# Patient Record
Sex: MALE | Race: WHITE | HISPANIC OR LATINO | URBAN - METROPOLITAN AREA
[De-identification: names, ages, dates, MRNs, and addresses within clinical notes are randomized per-mention and may not be internally consistent; named-entity substitution may affect disease eponyms.]

---

## 2023-05-19 ENCOUNTER — INPATIENT (INPATIENT)
Facility: HOSPITAL | Age: 28
LOS: 4 days | Discharge: HOME HEALTH | DRG: 493 | End: 2023-05-24
Attending: SURGERY | Admitting: SURGERY
Payer: COMMERCIAL

## 2023-05-19 ENCOUNTER — TRANSCRIPTION ENCOUNTER (OUTPATIENT)
Age: 28
End: 2023-05-19

## 2023-05-19 VITALS
HEART RATE: 84 BPM | DIASTOLIC BLOOD PRESSURE: 92 MMHG | OXYGEN SATURATION: 99 % | TEMPERATURE: 98 F | SYSTOLIC BLOOD PRESSURE: 148 MMHG | RESPIRATION RATE: 22 BRPM | WEIGHT: 164.91 LBS

## 2023-05-19 LAB
ALBUMIN SERPL ELPH-MCNC: 4.6 G/DL — SIGNIFICANT CHANGE UP (ref 3.3–5.2)
ALP SERPL-CCNC: 76 U/L — SIGNIFICANT CHANGE UP (ref 40–120)
ALT FLD-CCNC: 37 U/L — SIGNIFICANT CHANGE UP
ANION GAP SERPL CALC-SCNC: 16 MMOL/L — SIGNIFICANT CHANGE UP (ref 5–17)
APTT BLD: 27.1 SEC — LOW (ref 27.5–35.5)
AST SERPL-CCNC: 28 U/L — SIGNIFICANT CHANGE UP
BASE EXCESS BLDV CALC-SCNC: -0.8 MMOL/L — SIGNIFICANT CHANGE UP (ref -2–3)
BASOPHILS # BLD AUTO: 0.04 K/UL — SIGNIFICANT CHANGE UP (ref 0–0.2)
BASOPHILS NFR BLD AUTO: 0.2 % — SIGNIFICANT CHANGE UP (ref 0–2)
BILIRUB SERPL-MCNC: 0.4 MG/DL — SIGNIFICANT CHANGE UP (ref 0.4–2)
BUN SERPL-MCNC: 17 MG/DL — SIGNIFICANT CHANGE UP (ref 8–20)
CA-I SERPL-SCNC: 1.06 MMOL/L — LOW (ref 1.15–1.33)
CALCIUM SERPL-MCNC: 8.4 MG/DL — SIGNIFICANT CHANGE UP (ref 8.4–10.5)
CHLORIDE BLDV-SCNC: 99 MMOL/L — SIGNIFICANT CHANGE UP (ref 96–108)
CHLORIDE SERPL-SCNC: 97 MMOL/L — SIGNIFICANT CHANGE UP (ref 96–108)
CK MB CFR SERPL CALC: 4.9 NG/ML — SIGNIFICANT CHANGE UP (ref 0–6.7)
CK SERPL-CCNC: 395 U/L — HIGH (ref 30–200)
CO2 SERPL-SCNC: 22 MMOL/L — SIGNIFICANT CHANGE UP (ref 22–29)
CREAT SERPL-MCNC: 0.76 MG/DL — SIGNIFICANT CHANGE UP (ref 0.5–1.3)
EGFR: 126 ML/MIN/1.73M2 — SIGNIFICANT CHANGE UP
EOSINOPHIL # BLD AUTO: 0.01 K/UL — SIGNIFICANT CHANGE UP (ref 0–0.5)
EOSINOPHIL NFR BLD AUTO: 0.1 % — SIGNIFICANT CHANGE UP (ref 0–6)
ETHANOL SERPL-MCNC: <10 MG/DL — SIGNIFICANT CHANGE UP (ref 0–9)
GAS PNL BLDV: 133 MMOL/L — LOW (ref 136–145)
GAS PNL BLDV: SIGNIFICANT CHANGE UP
GLUCOSE BLDV-MCNC: 183 MG/DL — HIGH (ref 70–99)
GLUCOSE SERPL-MCNC: 180 MG/DL — HIGH (ref 70–99)
HCO3 BLDV-SCNC: 25 MMOL/L — SIGNIFICANT CHANGE UP (ref 22–29)
HCT VFR BLD CALC: 40.7 % — SIGNIFICANT CHANGE UP (ref 39–50)
HCT VFR BLDA CALC: 44 % — SIGNIFICANT CHANGE UP
HGB BLD CALC-MCNC: 14.6 G/DL — SIGNIFICANT CHANGE UP (ref 12.6–17.4)
HGB BLD-MCNC: 14.3 G/DL — SIGNIFICANT CHANGE UP (ref 13–17)
IMM GRANULOCYTES NFR BLD AUTO: 0.6 % — SIGNIFICANT CHANGE UP (ref 0–0.9)
INR BLD: 1.07 RATIO — SIGNIFICANT CHANGE UP (ref 0.88–1.16)
LACTATE BLDV-MCNC: 1.9 MMOL/L — SIGNIFICANT CHANGE UP (ref 0.5–2)
LYMPHOCYTES # BLD AUTO: 0.81 K/UL — LOW (ref 1–3.3)
LYMPHOCYTES # BLD AUTO: 4.8 % — LOW (ref 13–44)
MCHC RBC-ENTMCNC: 29.7 PG — SIGNIFICANT CHANGE UP (ref 27–34)
MCHC RBC-ENTMCNC: 35.1 GM/DL — SIGNIFICANT CHANGE UP (ref 32–36)
MCV RBC AUTO: 84.6 FL — SIGNIFICANT CHANGE UP (ref 80–100)
MONOCYTES # BLD AUTO: 0.91 K/UL — HIGH (ref 0–0.9)
MONOCYTES NFR BLD AUTO: 5.4 % — SIGNIFICANT CHANGE UP (ref 2–14)
NEUTROPHILS # BLD AUTO: 15.13 K/UL — HIGH (ref 1.8–7.4)
NEUTROPHILS NFR BLD AUTO: 88.9 % — HIGH (ref 43–77)
PCO2 BLDV: 45 MMHG — SIGNIFICANT CHANGE UP (ref 42–55)
PH BLDV: 7.35 — SIGNIFICANT CHANGE UP (ref 7.32–7.43)
PLATELET # BLD AUTO: 260 K/UL — SIGNIFICANT CHANGE UP (ref 150–400)
PO2 BLDV: 96 MMHG — HIGH (ref 25–45)
POTASSIUM BLDV-SCNC: 3.5 MMOL/L — SIGNIFICANT CHANGE UP (ref 3.5–5.1)
POTASSIUM SERPL-MCNC: 3.6 MMOL/L — SIGNIFICANT CHANGE UP (ref 3.5–5.3)
POTASSIUM SERPL-SCNC: 3.6 MMOL/L — SIGNIFICANT CHANGE UP (ref 3.5–5.3)
PROT SERPL-MCNC: 7.3 G/DL — SIGNIFICANT CHANGE UP (ref 6.6–8.7)
PROTHROM AB SERPL-ACNC: 12.4 SEC — SIGNIFICANT CHANGE UP (ref 10.5–13.4)
RBC # BLD: 4.81 M/UL — SIGNIFICANT CHANGE UP (ref 4.2–5.8)
RBC # FLD: 12.1 % — SIGNIFICANT CHANGE UP (ref 10.3–14.5)
SAO2 % BLDV: 98.6 % — SIGNIFICANT CHANGE UP
SODIUM SERPL-SCNC: 135 MMOL/L — SIGNIFICANT CHANGE UP (ref 135–145)
TROPONIN T SERPL-MCNC: <0.01 NG/ML — SIGNIFICANT CHANGE UP (ref 0–0.06)
WBC # BLD: 17 K/UL — HIGH (ref 3.8–10.5)
WBC # FLD AUTO: 17 K/UL — HIGH (ref 3.8–10.5)

## 2023-05-19 PROCEDURE — 99285 EMERGENCY DEPT VISIT HI MDM: CPT

## 2023-05-19 PROCEDURE — 74177 CT ABD & PELVIS W/CONTRAST: CPT | Mod: 26,MA

## 2023-05-19 PROCEDURE — G1004: CPT

## 2023-05-19 PROCEDURE — 70450 CT HEAD/BRAIN W/O DYE: CPT | Mod: 26,MG

## 2023-05-19 PROCEDURE — 71260 CT THORAX DX C+: CPT | Mod: 26,MA

## 2023-05-19 PROCEDURE — 73564 X-RAY EXAM KNEE 4 OR MORE: CPT | Mod: 26,RT

## 2023-05-19 PROCEDURE — 70486 CT MAXILLOFACIAL W/O DYE: CPT | Mod: 26,MA

## 2023-05-19 PROCEDURE — 93010 ELECTROCARDIOGRAM REPORT: CPT

## 2023-05-19 PROCEDURE — 73610 X-RAY EXAM OF ANKLE: CPT | Mod: 26,RT

## 2023-05-19 PROCEDURE — 71045 X-RAY EXAM CHEST 1 VIEW: CPT | Mod: 26

## 2023-05-19 PROCEDURE — 73030 X-RAY EXAM OF SHOULDER: CPT | Mod: 26,LT

## 2023-05-19 PROCEDURE — 72125 CT NECK SPINE W/O DYE: CPT | Mod: 26,MG

## 2023-05-19 PROCEDURE — 73590 X-RAY EXAM OF LOWER LEG: CPT | Mod: 26,RT

## 2023-05-19 RX ORDER — ACETAMINOPHEN 500 MG
650 TABLET ORAL ONCE
Refills: 0 | Status: COMPLETED | OUTPATIENT
Start: 2023-05-19 | End: 2023-05-19

## 2023-05-19 RX ORDER — MORPHINE SULFATE 50 MG/1
4 CAPSULE, EXTENDED RELEASE ORAL ONCE
Refills: 0 | Status: DISCONTINUED | OUTPATIENT
Start: 2023-05-19 | End: 2023-05-19

## 2023-05-19 RX ORDER — ACETAMINOPHEN 500 MG
1000 TABLET ORAL ONCE
Refills: 0 | Status: COMPLETED | OUTPATIENT
Start: 2023-05-19 | End: 2023-05-19

## 2023-05-19 RX ORDER — ONDANSETRON 8 MG/1
4 TABLET, FILM COATED ORAL ONCE
Refills: 0 | Status: COMPLETED | OUTPATIENT
Start: 2023-05-19 | End: 2023-05-19

## 2023-05-19 RX ORDER — SODIUM CHLORIDE 9 MG/ML
1000 INJECTION, SOLUTION INTRAVENOUS ONCE
Refills: 0 | Status: COMPLETED | OUTPATIENT
Start: 2023-05-19 | End: 2023-05-19

## 2023-05-19 RX ORDER — TETANUS TOXOID, REDUCED DIPHTHERIA TOXOID AND ACELLULAR PERTUSSIS VACCINE, ADSORBED 5; 2.5; 8; 8; 2.5 [IU]/.5ML; [IU]/.5ML; UG/.5ML; UG/.5ML; UG/.5ML
0.5 SUSPENSION INTRAMUSCULAR ONCE
Refills: 0 | Status: COMPLETED | OUTPATIENT
Start: 2023-05-19 | End: 2023-05-19

## 2023-05-19 RX ADMIN — MORPHINE SULFATE 4 MILLIGRAM(S): 50 CAPSULE, EXTENDED RELEASE ORAL at 19:00

## 2023-05-19 RX ADMIN — TETANUS TOXOID, REDUCED DIPHTHERIA TOXOID AND ACELLULAR PERTUSSIS VACCINE, ADSORBED 0.5 MILLILITER(S): 5; 2.5; 8; 8; 2.5 SUSPENSION INTRAMUSCULAR at 23:36

## 2023-05-19 RX ADMIN — MORPHINE SULFATE 4 MILLIGRAM(S): 50 CAPSULE, EXTENDED RELEASE ORAL at 19:45

## 2023-05-19 RX ADMIN — MORPHINE SULFATE 4 MILLIGRAM(S): 50 CAPSULE, EXTENDED RELEASE ORAL at 20:00

## 2023-05-19 RX ADMIN — MORPHINE SULFATE 4 MILLIGRAM(S): 50 CAPSULE, EXTENDED RELEASE ORAL at 21:27

## 2023-05-19 RX ADMIN — SODIUM CHLORIDE 1000 MILLILITER(S): 9 INJECTION, SOLUTION INTRAVENOUS at 23:36

## 2023-05-19 RX ADMIN — ONDANSETRON 4 MILLIGRAM(S): 8 TABLET, FILM COATED ORAL at 21:56

## 2023-05-19 RX ADMIN — Medication 650 MILLIGRAM(S): at 19:30

## 2023-05-19 RX ADMIN — Medication 650 MILLIGRAM(S): at 19:00
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
## 2023-05-19 NOTE — ED ADULT NURSE NOTE - EXTENSIONS OF SELF_ADULT
None
Implemented All Universal Safety Interventions:  [place] to call system. Call bell, personal items and telephone within reach. Instruct patient to call for assistance. Room bathroom lighting operational. Non-slip footwear when patient is off stretcher. Physically safe environment: no spills, clutter or unnecessary equipment. Stretcher in lowest position, wheels locked, appropriate side rails in place.
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
## 2023-05-19 NOTE — ED ADULT NURSE NOTE - PRIMARY CARE PROVIDER
see provider note Libtayo Pregnancy And Lactation Text: This medication is contraindicated in pregnancy and when breast feeding.

## 2023-05-19 NOTE — ED PROVIDER NOTE - CARE PLAN
Principal Discharge DX:	Tibial fracture  Secondary Diagnosis:	Traumatic injury of shoulder with fracture of scapula   1
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
## 2023-05-19 NOTE — ED ADULT NURSE NOTE - NSFALLUNIVINTERV_ED_ALL_ED
Bed/Stretcher in lowest position, wheels locked, appropriate side rails in place/Call bell, personal items and telephone in reach/Instruct patient to call for assistance before getting out of bed/chair/stretcher/Non-slip footwear applied when patient is off stretcher/[place] to call system/Physically safe environment - no spills, clutter or unnecessary equipment/Purposeful proactive rounding/Room/bathroom lighting operational, light cord in reach
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
## 2023-05-20 DIAGNOSIS — S82.209A UNSPECIFIED FRACTURE OF SHAFT OF UNSPECIFIED TIBIA, INITIAL ENCOUNTER FOR CLOSED FRACTURE: ICD-10-CM

## 2023-05-20 LAB
ABO RH CONFIRMATION: SIGNIFICANT CHANGE UP
ANION GAP SERPL CALC-SCNC: 13 MMOL/L — SIGNIFICANT CHANGE UP (ref 5–17)
BASOPHILS # BLD AUTO: 0.01 K/UL — SIGNIFICANT CHANGE UP (ref 0–0.2)
BASOPHILS NFR BLD AUTO: 0.1 % — SIGNIFICANT CHANGE UP (ref 0–2)
BLD GP AB SCN SERPL QL: SIGNIFICANT CHANGE UP
BUN SERPL-MCNC: 15.6 MG/DL — SIGNIFICANT CHANGE UP (ref 8–20)
CALCIUM SERPL-MCNC: 8.6 MG/DL — SIGNIFICANT CHANGE UP (ref 8.4–10.5)
CHLORIDE SERPL-SCNC: 99 MMOL/L — SIGNIFICANT CHANGE UP (ref 96–108)
CK MB CFR SERPL CALC: 5.1 NG/ML — SIGNIFICANT CHANGE UP (ref 0–6.7)
CK SERPL-CCNC: 475 U/L — HIGH (ref 30–200)
CO2 SERPL-SCNC: 26 MMOL/L — SIGNIFICANT CHANGE UP (ref 22–29)
CREAT SERPL-MCNC: 0.84 MG/DL — SIGNIFICANT CHANGE UP (ref 0.5–1.3)
EGFR: 122 ML/MIN/1.73M2 — SIGNIFICANT CHANGE UP
EOSINOPHIL # BLD AUTO: 0 K/UL — SIGNIFICANT CHANGE UP (ref 0–0.5)
EOSINOPHIL NFR BLD AUTO: 0 % — SIGNIFICANT CHANGE UP (ref 0–6)
GLUCOSE SERPL-MCNC: 160 MG/DL — HIGH (ref 70–99)
HCT VFR BLD CALC: 40.4 % — SIGNIFICANT CHANGE UP (ref 39–50)
HGB BLD-MCNC: 13.6 G/DL — SIGNIFICANT CHANGE UP (ref 13–17)
IMM GRANULOCYTES NFR BLD AUTO: 0.4 % — SIGNIFICANT CHANGE UP (ref 0–0.9)
LYMPHOCYTES # BLD AUTO: 0.85 K/UL — LOW (ref 1–3.3)
LYMPHOCYTES # BLD AUTO: 6.7 % — LOW (ref 13–44)
MAGNESIUM SERPL-MCNC: 1.8 MG/DL — SIGNIFICANT CHANGE UP (ref 1.6–2.6)
MCHC RBC-ENTMCNC: 29.2 PG — SIGNIFICANT CHANGE UP (ref 27–34)
MCHC RBC-ENTMCNC: 33.7 GM/DL — SIGNIFICANT CHANGE UP (ref 32–36)
MCV RBC AUTO: 86.7 FL — SIGNIFICANT CHANGE UP (ref 80–100)
MONOCYTES # BLD AUTO: 0.63 K/UL — SIGNIFICANT CHANGE UP (ref 0–0.9)
MONOCYTES NFR BLD AUTO: 5 % — SIGNIFICANT CHANGE UP (ref 2–14)
MRSA PCR RESULT.: SIGNIFICANT CHANGE UP
NEUTROPHILS # BLD AUTO: 11.08 K/UL — HIGH (ref 1.8–7.4)
NEUTROPHILS NFR BLD AUTO: 87.8 % — HIGH (ref 43–77)
PHOSPHATE SERPL-MCNC: 4.1 MG/DL — SIGNIFICANT CHANGE UP (ref 2.4–4.7)
PLATELET # BLD AUTO: 258 K/UL — SIGNIFICANT CHANGE UP (ref 150–400)
POTASSIUM SERPL-MCNC: 4.4 MMOL/L — SIGNIFICANT CHANGE UP (ref 3.5–5.3)
POTASSIUM SERPL-SCNC: 4.4 MMOL/L — SIGNIFICANT CHANGE UP (ref 3.5–5.3)
RBC # BLD: 4.66 M/UL — SIGNIFICANT CHANGE UP (ref 4.2–5.8)
RBC # FLD: 12.2 % — SIGNIFICANT CHANGE UP (ref 10.3–14.5)
S AUREUS DNA NOSE QL NAA+PROBE: SIGNIFICANT CHANGE UP
SODIUM SERPL-SCNC: 138 MMOL/L — SIGNIFICANT CHANGE UP (ref 135–145)
WBC # BLD: 12.62 K/UL — HIGH (ref 3.8–10.5)
WBC # FLD AUTO: 12.62 K/UL — HIGH (ref 3.8–10.5)

## 2023-05-20 PROCEDURE — 73080 X-RAY EXAM OF ELBOW: CPT | Mod: 26,LT

## 2023-05-20 PROCEDURE — 73090 X-RAY EXAM OF FOREARM: CPT | Mod: 26,LT

## 2023-05-20 PROCEDURE — 23570 CLTX SCAPULAR FX W/O MNPJ: CPT | Mod: LT

## 2023-05-20 PROCEDURE — 27759 TREATMENT OF TIBIA FRACTURE: CPT | Mod: RT

## 2023-05-20 PROCEDURE — 73060 X-RAY EXAM OF HUMERUS: CPT | Mod: 26,LT

## 2023-05-20 PROCEDURE — 73000 X-RAY EXAM OF COLLAR BONE: CPT | Mod: 26,LT

## 2023-05-20 PROCEDURE — 99223 1ST HOSP IP/OBS HIGH 75: CPT | Mod: 57

## 2023-05-20 PROCEDURE — 73590 X-RAY EXAM OF LOWER LEG: CPT | Mod: 26,RT

## 2023-05-20 DEVICE — NAIL TIB INTRAMED T2 ALPHA 10X330MM STRL: Type: IMPLANTABLE DEVICE | Site: RIGHT | Status: FUNCTIONAL

## 2023-05-20 DEVICE — K-WIRE STRYKER 3MM X 285MM: Type: IMPLANTABLE DEVICE | Site: RIGHT | Status: FUNCTIONAL

## 2023-05-20 DEVICE — SCREW LOKG 5X42.5MM: Type: IMPLANTABLE DEVICE | Site: RIGHT | Status: FUNCTIONAL

## 2023-05-20 DEVICE — SCREW LOKG 5X40MM: Type: IMPLANTABLE DEVICE | Site: RIGHT | Status: FUNCTIONAL

## 2023-05-20 DEVICE — SCREW LOKG 5X57MM STRL: Type: IMPLANTABLE DEVICE | Site: RIGHT | Status: FUNCTIONAL

## 2023-05-20 DEVICE — SCREW LOKG 5X45MM: Type: IMPLANTABLE DEVICE | Site: RIGHT | Status: FUNCTIONAL

## 2023-05-20 DEVICE — GUIDEWIRE BALL TIP 3MM X 1000MM FOR T2 R1.5 FEMORAL NAILING SYSTEM: Type: IMPLANTABLE DEVICE | Site: RIGHT | Status: FUNCTIONAL

## 2023-05-20 RX ORDER — TRAMADOL HYDROCHLORIDE 50 MG/1
50 TABLET ORAL EVERY 4 HOURS
Refills: 0 | Status: DISCONTINUED | OUTPATIENT
Start: 2023-05-20 | End: 2023-05-20

## 2023-05-20 RX ORDER — ENOXAPARIN SODIUM 100 MG/ML
40 INJECTION SUBCUTANEOUS ONCE
Refills: 0 | Status: DISCONTINUED | OUTPATIENT
Start: 2023-05-20 | End: 2023-05-20

## 2023-05-20 RX ORDER — SODIUM CHLORIDE 9 MG/ML
1000 INJECTION, SOLUTION INTRAVENOUS
Refills: 0 | Status: DISCONTINUED | OUTPATIENT
Start: 2023-05-20 | End: 2023-05-20

## 2023-05-20 RX ORDER — ACETAMINOPHEN 500 MG
975 TABLET ORAL EVERY 6 HOURS
Refills: 0 | Status: DISCONTINUED | OUTPATIENT
Start: 2023-05-20 | End: 2023-05-24

## 2023-05-20 RX ORDER — CHLORHEXIDINE GLUCONATE 213 G/1000ML
1 SOLUTION TOPICAL
Refills: 0 | Status: DISCONTINUED | OUTPATIENT
Start: 2023-05-20 | End: 2023-05-20

## 2023-05-20 RX ORDER — ACETAMINOPHEN 500 MG
975 TABLET ORAL EVERY 6 HOURS
Refills: 0 | Status: DISCONTINUED | OUTPATIENT
Start: 2023-05-20 | End: 2023-05-20

## 2023-05-20 RX ORDER — MUPIROCIN 20 MG/G
1 OINTMENT TOPICAL
Refills: 0 | Status: DISCONTINUED | OUTPATIENT
Start: 2023-05-20 | End: 2023-05-20

## 2023-05-20 RX ORDER — FENTANYL CITRATE 50 UG/ML
50 INJECTION INTRAVENOUS
Refills: 0 | Status: DISCONTINUED | OUTPATIENT
Start: 2023-05-20 | End: 2023-05-20

## 2023-05-20 RX ORDER — ACETAMINOPHEN 500 MG
1000 TABLET ORAL ONCE
Refills: 0 | Status: COMPLETED | OUTPATIENT
Start: 2023-05-20 | End: 2023-05-20

## 2023-05-20 RX ORDER — ENOXAPARIN SODIUM 100 MG/ML
40 INJECTION SUBCUTANEOUS EVERY 12 HOURS
Refills: 0 | Status: DISCONTINUED | OUTPATIENT
Start: 2023-05-21 | End: 2023-05-24

## 2023-05-20 RX ORDER — TRAMADOL HYDROCHLORIDE 50 MG/1
50 TABLET ORAL EVERY 4 HOURS
Refills: 0 | Status: DISCONTINUED | OUTPATIENT
Start: 2023-05-20 | End: 2023-05-24

## 2023-05-20 RX ORDER — POVIDONE-IODINE 5 %
1 AEROSOL (ML) TOPICAL ONCE
Refills: 0 | Status: DISCONTINUED | OUTPATIENT
Start: 2023-05-20 | End: 2023-05-20

## 2023-05-20 RX ORDER — OXYCODONE HYDROCHLORIDE 5 MG/1
5 TABLET ORAL EVERY 4 HOURS
Refills: 0 | Status: DISCONTINUED | OUTPATIENT
Start: 2023-05-20 | End: 2023-05-20

## 2023-05-20 RX ORDER — CEFAZOLIN SODIUM 1 G
2000 VIAL (EA) INJECTION
Refills: 0 | Status: COMPLETED | OUTPATIENT
Start: 2023-05-20 | End: 2023-05-21

## 2023-05-20 RX ORDER — OXYCODONE HYDROCHLORIDE 5 MG/1
10 TABLET ORAL EVERY 4 HOURS
Refills: 0 | Status: DISCONTINUED | OUTPATIENT
Start: 2023-05-20 | End: 2023-05-24

## 2023-05-20 RX ORDER — ONDANSETRON 8 MG/1
4 TABLET, FILM COATED ORAL ONCE
Refills: 0 | Status: COMPLETED | OUTPATIENT
Start: 2023-05-20 | End: 2023-05-20

## 2023-05-20 RX ORDER — ONDANSETRON 8 MG/1
4 TABLET, FILM COATED ORAL ONCE
Refills: 0 | Status: DISCONTINUED | OUTPATIENT
Start: 2023-05-20 | End: 2023-05-20

## 2023-05-20 RX ADMIN — SODIUM CHLORIDE 100 MILLILITER(S): 9 INJECTION, SOLUTION INTRAVENOUS at 03:30

## 2023-05-20 RX ADMIN — Medication 975 MILLIGRAM(S): at 06:42

## 2023-05-20 RX ADMIN — ONDANSETRON 4 MILLIGRAM(S): 8 TABLET, FILM COATED ORAL at 00:23

## 2023-05-20 RX ADMIN — MORPHINE SULFATE 4 MILLIGRAM(S): 50 CAPSULE, EXTENDED RELEASE ORAL at 00:00

## 2023-05-20 RX ADMIN — Medication 1000 MILLIGRAM(S): at 00:49

## 2023-05-20 RX ADMIN — Medication 975 MILLIGRAM(S): at 05:42

## 2023-05-20 RX ADMIN — MUPIROCIN 1 APPLICATION(S): 20 OINTMENT TOPICAL at 06:36

## 2023-05-20 RX ADMIN — Medication 975 MILLIGRAM(S): at 12:50

## 2023-05-20 RX ADMIN — OXYCODONE HYDROCHLORIDE 5 MILLIGRAM(S): 5 TABLET ORAL at 06:36

## 2023-05-20 RX ADMIN — Medication 1000 MILLIGRAM(S): at 21:58

## 2023-05-20 RX ADMIN — CHLORHEXIDINE GLUCONATE 1 APPLICATION(S): 213 SOLUTION TOPICAL at 06:45

## 2023-05-20 RX ADMIN — MORPHINE SULFATE 4 MILLIGRAM(S): 50 CAPSULE, EXTENDED RELEASE ORAL at 00:19

## 2023-05-20 RX ADMIN — Medication 400 MILLIGRAM(S): at 21:58

## 2023-05-20 RX ADMIN — Medication 400 MILLIGRAM(S): at 00:19

## 2023-05-20 RX ADMIN — Medication 975 MILLIGRAM(S): at 12:20

## 2023-05-20 NOTE — H&P ADULT - NSHPPHYSICALEXAM_GEN_ALL_CORE
Constitutional: Well-developed well nourished male sitting in bed in no acute distress  HEENT: Head is normocephalic, maxillofacial structures stable but tender laterally on left side, no active blood or discharge from nares or oral cavity, no connors sign / raccoon eyes, EOMI b/l, no active drainage or redness  Neck: Supple, full ROM, trachea midline  Respiratory: Unlabored, no conversational dyspnea  Cardiovascular: Regular rate & rhythm  Chest: Chest wall is non-tender to palpation, no subQ emphysema or crepitus palpated. Left shoulder swelling, tenderness over clavicle with ecchymosis. Stable Clavicle    Gastrointestinal: Abdomen soft, non-tender, non-distended, no rebound tenderness / guarding, no ecchymosis or external signs of abdominal trauma  Extremities: LUE ROM limited by pain, unable to lift arm past horizon. RLE with deformity which was splinted. No skin tenting.  Compartments soft.  Pelvis: stable  Vascular: WWP  Neurological: GCS: 15 (4/5/6). A&O x 3; no gross sensory / motor / coordination deficits  Back: no C/T/LS spine tenderness to palpation, no step-offs or signs of external trauma to the back

## 2023-05-20 NOTE — H&P ADULT - NSHPLABSRESULTS_GEN_ALL_CORE
Vital Signs Last 24 Hrs  T(C): 36.8 (20 May 2023 06:25), Max: 36.9 (19 May 2023 17:20)  T(F): 98.2 (20 May 2023 06:25), Max: 98.4 (19 May 2023 17:20)  HR: 90 (20 May 2023 06:25) (74 - 90)  BP: 126/80 (20 May 2023 06:25) (113/69 - 148/92)  BP(mean): --  RR: 16 (20 May 2023 06:25) (16 - 22)  SpO2: 96% (20 May 2023 06:25) (96% - 99%)    Parameters below as of 20 May 2023 06:25  Patient On (Oxygen Delivery Method): room air          LABS:                        13.6   12.62 )-----------( 258      ( 20 May 2023 04:22 )             40.4     05-20    138  |  99  |  15.6  ----------------------------<  160<H>  4.4   |  26.0  |  0.84    Ca    8.6      20 May 2023 04:22  Phos  4.1     05-20  Mg     1.8     05-20    TPro  7.3  /  Alb  4.6  /  TBili  0.4  /  DBili  x   /  AST  28  /  ALT  37  /  AlkPhos  76  05-19    PT/INR - ( 19 May 2023 21:50 )   PT: 12.4 sec;   INR: 1.07 ratio      PTT - ( 19 May 2023 21:50 )  PTT:27.1 sec      IMAGING:  CT H / Neck / Max Face  IMPRESSION:  CT BRAIN: Unremarkable, unenhanced CT.    CT CERVICAL SPINE:  1. No acute fracture or traumatic malalignment.  2. Subcutaneous contusion in the posterior neck.    CT FACE:  1. Acute fractures of the anterior and lateral walls of the left maxillary sinus, extends to the inferior orbital wall. No herniation of orbital contents.      MILAGROS MCMULLEN MD; Attending Radiologist  This document has been electronically signed. May 19 2023 11:28PM      CT Ch/A/P  IMPRESSION:  Comminuted displaced fracture of the left scapula with associated soft tissue swelling. Partially imaged mild left shoulder soft tissue swelling.  Otherwise, no sequela of acute traumatic injury within the chest, abdomen or pelvis.    KISHORE PENA MD; Attending Radiologist  This document has been electronically signed. May 19 2023 11:27PM        Plain Films: pending final reads  RLE- Tibial Frx  LUE- no obvious frx
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
## 2023-05-20 NOTE — H&P ADULT - NSICDXNOPASTMEDICALHX_GEN_ALL_CORE
Problem: Adult Inpatient Plan of Care  Goal: Plan of Care Review  Outcome: Ongoing, Progressing  Goal: Patient-Specific Goal (Individualized)  Outcome: Ongoing, Progressing  Goal: Absence of Hospital-Acquired Illness or Injury  Outcome: Ongoing, Progressing  Goal: Optimal Comfort and Wellbeing  Outcome: Ongoing, Progressing  Goal: Readiness for Transition of Care  Outcome: Ongoing, Progressing     Problem: Fall Injury Risk  Goal: Absence of Fall and Fall-Related Injury  Outcome: Ongoing, Progressing     Problem: Skin Injury Risk Increased  Goal: Skin Health and Integrity  Outcome: Ongoing, Progressing     
<-- Click to add NO pertinent Past Medical History
Statement Selected
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
## 2023-05-21 LAB
ANION GAP SERPL CALC-SCNC: 10 MMOL/L — SIGNIFICANT CHANGE UP (ref 5–17)
BUN SERPL-MCNC: 13.1 MG/DL — SIGNIFICANT CHANGE UP (ref 8–20)
CALCIUM SERPL-MCNC: 7.9 MG/DL — LOW (ref 8.4–10.5)
CHLORIDE SERPL-SCNC: 104 MMOL/L — SIGNIFICANT CHANGE UP (ref 96–108)
CO2 SERPL-SCNC: 25 MMOL/L — SIGNIFICANT CHANGE UP (ref 22–29)
CREAT SERPL-MCNC: 0.79 MG/DL — SIGNIFICANT CHANGE UP (ref 0.5–1.3)
EGFR: 124 ML/MIN/1.73M2 — SIGNIFICANT CHANGE UP
GLUCOSE SERPL-MCNC: 98 MG/DL — SIGNIFICANT CHANGE UP (ref 70–99)
HCT VFR BLD CALC: 35.2 % — LOW (ref 39–50)
HGB BLD-MCNC: 11.7 G/DL — LOW (ref 13–17)
MAGNESIUM SERPL-MCNC: 2 MG/DL — SIGNIFICANT CHANGE UP (ref 1.8–2.6)
MCHC RBC-ENTMCNC: 29.3 PG — SIGNIFICANT CHANGE UP (ref 27–34)
MCHC RBC-ENTMCNC: 33.2 GM/DL — SIGNIFICANT CHANGE UP (ref 32–36)
MCV RBC AUTO: 88.2 FL — SIGNIFICANT CHANGE UP (ref 80–100)
PHOSPHATE SERPL-MCNC: 3.3 MG/DL — SIGNIFICANT CHANGE UP (ref 2.4–4.7)
PLATELET # BLD AUTO: 212 K/UL — SIGNIFICANT CHANGE UP (ref 150–400)
POTASSIUM SERPL-MCNC: 4.1 MMOL/L — SIGNIFICANT CHANGE UP (ref 3.5–5.3)
POTASSIUM SERPL-SCNC: 4.1 MMOL/L — SIGNIFICANT CHANGE UP (ref 3.5–5.3)
RBC # BLD: 3.99 M/UL — LOW (ref 4.2–5.8)
RBC # FLD: 12.3 % — SIGNIFICANT CHANGE UP (ref 10.3–14.5)
SODIUM SERPL-SCNC: 139 MMOL/L — SIGNIFICANT CHANGE UP (ref 135–145)
WBC # BLD: 9.95 K/UL — SIGNIFICANT CHANGE UP (ref 3.8–10.5)
WBC # FLD AUTO: 9.95 K/UL — SIGNIFICANT CHANGE UP (ref 3.8–10.5)

## 2023-05-21 PROCEDURE — 99231 SBSQ HOSP IP/OBS SF/LOW 25: CPT

## 2023-05-21 RX ADMIN — OXYCODONE HYDROCHLORIDE 10 MILLIGRAM(S): 5 TABLET ORAL at 07:15

## 2023-05-21 RX ADMIN — OXYCODONE HYDROCHLORIDE 5 MILLIGRAM(S): 5 TABLET ORAL at 07:55

## 2023-05-21 RX ADMIN — Medication 975 MILLIGRAM(S): at 00:50

## 2023-05-21 RX ADMIN — Medication 975 MILLIGRAM(S): at 00:19

## 2023-05-21 RX ADMIN — OXYCODONE HYDROCHLORIDE 10 MILLIGRAM(S): 5 TABLET ORAL at 10:35

## 2023-05-21 RX ADMIN — OXYCODONE HYDROCHLORIDE 10 MILLIGRAM(S): 5 TABLET ORAL at 00:49

## 2023-05-21 RX ADMIN — Medication 2000 MILLIGRAM(S): at 09:19

## 2023-05-21 RX ADMIN — Medication 975 MILLIGRAM(S): at 18:01

## 2023-05-21 RX ADMIN — ENOXAPARIN SODIUM 40 MILLIGRAM(S): 100 INJECTION SUBCUTANEOUS at 17:09

## 2023-05-21 RX ADMIN — Medication 2000 MILLIGRAM(S): at 00:20

## 2023-05-21 RX ADMIN — OXYCODONE HYDROCHLORIDE 10 MILLIGRAM(S): 5 TABLET ORAL at 20:00

## 2023-05-21 RX ADMIN — Medication 975 MILLIGRAM(S): at 12:28

## 2023-05-21 RX ADMIN — OXYCODONE HYDROCHLORIDE 10 MILLIGRAM(S): 5 TABLET ORAL at 06:31

## 2023-05-21 RX ADMIN — OXYCODONE HYDROCHLORIDE 10 MILLIGRAM(S): 5 TABLET ORAL at 00:19

## 2023-05-21 RX ADMIN — Medication 975 MILLIGRAM(S): at 06:34

## 2023-05-21 RX ADMIN — OXYCODONE HYDROCHLORIDE 10 MILLIGRAM(S): 5 TABLET ORAL at 21:00

## 2023-05-21 RX ADMIN — Medication 975 MILLIGRAM(S): at 06:31

## 2023-05-21 RX ADMIN — Medication 975 MILLIGRAM(S): at 13:28

## 2023-05-21 RX ADMIN — ENOXAPARIN SODIUM 40 MILLIGRAM(S): 100 INJECTION SUBCUTANEOUS at 06:31

## 2023-05-21 RX ADMIN — OXYCODONE HYDROCHLORIDE 10 MILLIGRAM(S): 5 TABLET ORAL at 09:35

## 2023-05-21 RX ADMIN — Medication 975 MILLIGRAM(S): at 17:09

## 2023-05-21 NOTE — PROGRESS NOTE ADULT - NS_MD_PANP_GEN_ALL_CORE
Attending and PA/NP shared services statement (NON-critical care):
Attending and PA/NP shared services statement (NON-critical care):
Statement Selected

## 2023-05-21 NOTE — PROGRESS NOTE ADULT - NS ATTEND AMEND GEN_ALL_CORE FT
Agree with above assessment.  The patient was seen and examined by myself with the surgical PA.  The patient is with left shoulder and right leg pain.  The patient is without abdominal or chest pain.  Abdomen is soft and non tender.  The right leg is in post op dressing. toes pink and warm.  PT/.OT, pain control, will advance diet.
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
## 2023-05-22 ENCOUNTER — TRANSCRIPTION ENCOUNTER (OUTPATIENT)
Age: 28
End: 2023-05-22

## 2023-05-22 LAB
ANION GAP SERPL CALC-SCNC: 13 MMOL/L — SIGNIFICANT CHANGE UP (ref 5–17)
BUN SERPL-MCNC: 12.1 MG/DL — SIGNIFICANT CHANGE UP (ref 8–20)
CALCIUM SERPL-MCNC: 8.1 MG/DL — LOW (ref 8.4–10.5)
CHLORIDE SERPL-SCNC: 101 MMOL/L — SIGNIFICANT CHANGE UP (ref 96–108)
CO2 SERPL-SCNC: 25 MMOL/L — SIGNIFICANT CHANGE UP (ref 22–29)
CREAT SERPL-MCNC: 0.71 MG/DL — SIGNIFICANT CHANGE UP (ref 0.5–1.3)
EGFR: 128 ML/MIN/1.73M2 — SIGNIFICANT CHANGE UP
GLUCOSE SERPL-MCNC: 99 MG/DL — SIGNIFICANT CHANGE UP (ref 70–99)
HCT VFR BLD CALC: 34.5 % — LOW (ref 39–50)
HGB BLD-MCNC: 11.9 G/DL — LOW (ref 13–17)
MAGNESIUM SERPL-MCNC: 1.8 MG/DL — SIGNIFICANT CHANGE UP (ref 1.6–2.6)
MCHC RBC-ENTMCNC: 29.9 PG — SIGNIFICANT CHANGE UP (ref 27–34)
MCHC RBC-ENTMCNC: 34.5 GM/DL — SIGNIFICANT CHANGE UP (ref 32–36)
MCV RBC AUTO: 86.7 FL — SIGNIFICANT CHANGE UP (ref 80–100)
PHOSPHATE SERPL-MCNC: 2.8 MG/DL — SIGNIFICANT CHANGE UP (ref 2.4–4.7)
PLATELET # BLD AUTO: 227 K/UL — SIGNIFICANT CHANGE UP (ref 150–400)
POTASSIUM SERPL-MCNC: 3.5 MMOL/L — SIGNIFICANT CHANGE UP (ref 3.5–5.3)
POTASSIUM SERPL-SCNC: 3.5 MMOL/L — SIGNIFICANT CHANGE UP (ref 3.5–5.3)
RBC # BLD: 3.98 M/UL — LOW (ref 4.2–5.8)
RBC # FLD: 11.9 % — SIGNIFICANT CHANGE UP (ref 10.3–14.5)
SODIUM SERPL-SCNC: 138 MMOL/L — SIGNIFICANT CHANGE UP (ref 135–145)
WBC # BLD: 8.99 K/UL — SIGNIFICANT CHANGE UP (ref 3.8–10.5)
WBC # FLD AUTO: 8.99 K/UL — SIGNIFICANT CHANGE UP (ref 3.8–10.5)

## 2023-05-22 PROCEDURE — 99231 SBSQ HOSP IP/OBS SF/LOW 25: CPT

## 2023-05-22 RX ORDER — MAGNESIUM SULFATE 500 MG/ML
2 VIAL (ML) INJECTION ONCE
Refills: 0 | Status: COMPLETED | OUTPATIENT
Start: 2023-05-22 | End: 2023-05-22

## 2023-05-22 RX ORDER — IBUPROFEN 200 MG
1 TABLET ORAL
Qty: 120 | Refills: 0
Start: 2023-05-22 | End: 2023-06-20

## 2023-05-22 RX ORDER — OXYCODONE HYDROCHLORIDE 5 MG/1
1 TABLET ORAL
Qty: 20 | Refills: 0
Start: 2023-05-22 | End: 2023-05-26

## 2023-05-22 RX ORDER — ACETAMINOPHEN 500 MG
3 TABLET ORAL
Qty: 0 | Refills: 0 | DISCHARGE
Start: 2023-05-22

## 2023-05-22 RX ORDER — POTASSIUM CHLORIDE 20 MEQ
40 PACKET (EA) ORAL ONCE
Refills: 0 | Status: COMPLETED | OUTPATIENT
Start: 2023-05-22 | End: 2023-05-22

## 2023-05-22 RX ADMIN — OXYCODONE HYDROCHLORIDE 10 MILLIGRAM(S): 5 TABLET ORAL at 05:03

## 2023-05-22 RX ADMIN — TRAMADOL HYDROCHLORIDE 50 MILLIGRAM(S): 50 TABLET ORAL at 15:33

## 2023-05-22 RX ADMIN — ENOXAPARIN SODIUM 40 MILLIGRAM(S): 100 INJECTION SUBCUTANEOUS at 17:43

## 2023-05-22 RX ADMIN — ENOXAPARIN SODIUM 40 MILLIGRAM(S): 100 INJECTION SUBCUTANEOUS at 05:05

## 2023-05-22 RX ADMIN — Medication 975 MILLIGRAM(S): at 17:42

## 2023-05-22 RX ADMIN — Medication 975 MILLIGRAM(S): at 13:00

## 2023-05-22 RX ADMIN — Medication 975 MILLIGRAM(S): at 18:36

## 2023-05-22 RX ADMIN — TRAMADOL HYDROCHLORIDE 50 MILLIGRAM(S): 50 TABLET ORAL at 22:15

## 2023-05-22 RX ADMIN — OXYCODONE HYDROCHLORIDE 10 MILLIGRAM(S): 5 TABLET ORAL at 17:41

## 2023-05-22 RX ADMIN — Medication 25 GRAM(S): at 12:15

## 2023-05-22 RX ADMIN — OXYCODONE HYDROCHLORIDE 10 MILLIGRAM(S): 5 TABLET ORAL at 13:00

## 2023-05-22 RX ADMIN — Medication 40 MILLIEQUIVALENT(S): at 12:12

## 2023-05-22 RX ADMIN — OXYCODONE HYDROCHLORIDE 10 MILLIGRAM(S): 5 TABLET ORAL at 18:36

## 2023-05-22 RX ADMIN — Medication 975 MILLIGRAM(S): at 05:32

## 2023-05-22 RX ADMIN — TRAMADOL HYDROCHLORIDE 50 MILLIGRAM(S): 50 TABLET ORAL at 21:15

## 2023-05-22 RX ADMIN — OXYCODONE HYDROCHLORIDE 10 MILLIGRAM(S): 5 TABLET ORAL at 06:03

## 2023-05-22 RX ADMIN — OXYCODONE HYDROCHLORIDE 10 MILLIGRAM(S): 5 TABLET ORAL at 12:12

## 2023-05-22 RX ADMIN — TRAMADOL HYDROCHLORIDE 50 MILLIGRAM(S): 50 TABLET ORAL at 14:33

## 2023-05-22 RX ADMIN — Medication 975 MILLIGRAM(S): at 05:04

## 2023-05-22 RX ADMIN — Medication 975 MILLIGRAM(S): at 12:12
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
## 2023-05-22 NOTE — DISCHARGE NOTE PROVIDER - NSDCMRMEDTOKEN_GEN_ALL_CORE_FT
acetaminophen 325 mg oral tablet: 3 tab(s) orally every 6 hours  ibuprofen 600 mg oral tablet: 1 tab(s) orally every 6 hours as needed for  mild pain  oxyCODONE 5 mg oral tablet: 1 tab(s) orally every 6 hours as needed for severe pain MDD: 4   acetaminophen 325 mg oral tablet: 3 tab(s) orally every 6 hours  ibuprofen 600 mg oral tablet: 1 tab(s) orally every 6 hours as needed for  mild pain  oxyCODONE 5 mg oral tablet: 1 tab(s) orally every 6 hours as needed for severe pain MDD: 4  polyethylene glycol 3350 oral powder for reconstitution: 17 gram(s) orally once a day  senna leaf extract oral tablet: 2 tab(s) orally once a day (at bedtime)   acetaminophen 325 mg oral tablet: 3 tab(s) orally every 6 hours  ibuprofen 600 mg oral tablet: 1 tab(s) orally every 6 hours as needed for  mild pain  Narcan 4 mg/0.1 mL nasal spray: 4 milligram(s) intranasally once ** use in the event of opioid overdose and call 911 immediately **  oxyCODONE 5 mg oral tablet: 1 tab(s) orally every 6 hours as needed for severe pain MDD: 4

## 2023-05-22 NOTE — DISCHARGE NOTE NURSING/CASE MANAGEMENT/SOCIAL WORK - NSDCVIVACCINE_GEN_ALL_CORE_FT
Tdap; 19-May-2023 23:36; Susu Arias (RN); [employer]; [id number] (Exp. Date: 04-Mar-2025); IntraMuscular; Deltoid Right.; 0.5 milliLiter(s); VIS (VIS Published: 09-May-2013, VIS Presented: 19-May-2023);

## 2023-05-22 NOTE — DISCHARGE NOTE PROVIDER - NSDCFUADDINST_GEN_ALL_CORE_FT
The patient will be seen in the office between 2-3 weeks for wound check. Sutures/Staples/Tape will be removed at that time. Patient may shower after post-op day #3. The dressing is to be removed on post-op day #7. The patient will contact the office if the wound becomes red, has increasing pain, develops bleeding or discharge, an injury occurs, or has other concerns. The patient will continue PT for gait training. Lovenox twice a day. weightbearing as tolerated for right leg. Non weight bearing left upper extremity with use of sling.

## 2023-05-22 NOTE — DISCHARGE NOTE NURSING/CASE MANAGEMENT/SOCIAL WORK - PATIENT PORTAL LINK FT
You can access the FollowMyHealth Patient Portal offered by [facility] by registering at the following website: [email or web address]. By joining [email or web address] FollowMyHealth portal, you will also be able to view your health information using other applications (apps) compatible with our system.

## 2023-05-22 NOTE — DISCHARGE NOTE PROVIDER - CARE PROVIDER_API CALL
Shay Le (DO)  Orthopedics  [address] [place] [address] [phone]  Phone: [phone]  Fax: [phone]  Follow Up Time:

## 2023-05-22 NOTE — DISCHARGE NOTE NURSING/CASE MANAGEMENT/SOCIAL WORK - NSDCPEFALRISK_GEN_ALL_CORE
For information on Fall & Injury Prevention, visit: [email or web address] OR  [email or web address] OR  https://www.cdc.gov/steadi/patient.html

## 2023-05-22 NOTE — DISCHARGE NOTE PROVIDER - NSDCCPCAREPLAN_GEN_ALL_CORE_FT
PRINCIPAL DISCHARGE DIAGNOSIS  Diagnosis: Tibial fracture  Assessment and Plan of Treatment: Pt may resume regular diet as tolerated, showering ok, DO NOT submerge wound in water.  Keep post op site clean and dry.  Clean with soap and water only.  Weight bearing as tolerated.  Follow up with Ortho 2 weeks from discharge.  You must call to make an appointment.  Patient is advised to RETURN TO THE EMERGENCY DEPARTMENT for any of the following - worsening pain, fever/chills, nausea/vomiting, altered mental status, chest pain, shortness of breath, or any other new / worsening symptom.        SECONDARY DISCHARGE DIAGNOSES  Diagnosis: Traumatic injury of shoulder with fracture of scapula  Assessment and Plan of Treatment: Non weight bearing to Left upper extremity.  Use sling for comfort.     PRINCIPAL DISCHARGE DIAGNOSIS  Diagnosis: Tibial fracture  Assessment and Plan of Treatment: Pt may resume regular diet as tolerated, showering ok, DO NOT submerge wound in water.  Keep post op site clean and dry.  Clean with soap and water only.  Weight bearing as tolerated to right lower extremity.  Follow up with Ortho 2 weeks from discharge.  You must call to make an appointment.  Patient is advised to RETURN TO THE EMERGENCY DEPARTMENT for any of the following - worsening pain, fever/chills, nausea/vomiting, altered mental status, chest pain, shortness of breath, or any other new / worsening symptom.        SECONDARY DISCHARGE DIAGNOSES  Diagnosis: Traumatic injury of shoulder with fracture of scapula  Assessment and Plan of Treatment: Non weight bearing to Left upper extremity.  Use sling for comfort.     PRINCIPAL DISCHARGE DIAGNOSIS  Diagnosis: Tibial fracture  Assessment and Plan of Treatment: Follow up: Please call and make an appointment to see orthopedic surgeon Dr. Le 2 weeks after discharge. Also, please call and make an appointment with your primary care physician as per your usual schedule.   Activity: Weight bearing as tolerated to right lower extremity. Non-weight bearing to left upper extremity. Sling for comfort  Diet: May continue regular diet.  Medications: Please take all medications listed on your discharge paperwork as prescribed. Pain medication (oxycodone) has been prescribed for you. Please take these medications only as they have been prescribed - do not drive, do not operate heavy machinery, and do not make important decisions while taking oxycodone.  You are encouraged to take over-the-counter tylenol and/or ibuprofen for pain relief when you feel your pain no longer warrants the use of narcotic pain medications.  Wound Care: Patient may shower after post-op day #3. The dressing is to be removed on post-op day #7. The patient will contact the office if the wound becomes red, has increasing pain, develops bleeding or discharge, an injury occurs, or has other concerns.   Patient is advised to RETURN TO THE EMERGENCY DEPARTMENT for any of the following - worsening pain, fever/chills, nausea/vomiting, altered mental status, chest pain, shortness of breath, or any other new / worsening symptom.      SECONDARY DISCHARGE DIAGNOSES  Diagnosis: Traumatic injury of shoulder with fracture of scapula  Assessment and Plan of Treatment:

## 2023-05-22 NOTE — DISCHARGE NOTE PROVIDER - YES NO FOR MLM POSITIVE OR NEGATIVE COVID RESULT
Presented to ED for GI bleeding. Boarded in ED overnight. No further bowel movements. Hgb stable at around baseline of 9. VSS. Ambulating without difficulties. Discussed need for follow up for Watchman device. Discharged on aspirin but not coumadin. Risks and benefits of anticoagulation and atrial fibrillation discussed with the patient. Decision to not anticoagulate at this time. She has a dialysis appointment for tomorrow. Discharged home from ED in stable condition.    ,

## 2023-05-23 PROCEDURE — 99231 SBSQ HOSP IP/OBS SF/LOW 25: CPT

## 2023-05-23 RX ORDER — SENNA PLUS 8.6 MG/1
2 TABLET ORAL AT BEDTIME
Refills: 0 | Status: DISCONTINUED | OUTPATIENT
Start: 2023-05-23 | End: 2023-05-24

## 2023-05-23 RX ORDER — SENNA PLUS 8.6 MG/1
2 TABLET ORAL
Qty: 0 | Refills: 0 | DISCHARGE
Start: 2023-05-23

## 2023-05-23 RX ORDER — POLYETHYLENE GLYCOL 3350 17 G/17G
17 POWDER, FOR SOLUTION ORAL
Qty: 0 | Refills: 0 | DISCHARGE
Start: 2023-05-23

## 2023-05-23 RX ORDER — POLYETHYLENE GLYCOL 3350 17 G/17G
17 POWDER, FOR SOLUTION ORAL DAILY
Refills: 0 | Status: DISCONTINUED | OUTPATIENT
Start: 2023-05-23 | End: 2023-05-24

## 2023-05-23 RX ADMIN — Medication 975 MILLIGRAM(S): at 06:58

## 2023-05-23 RX ADMIN — OXYCODONE HYDROCHLORIDE 10 MILLIGRAM(S): 5 TABLET ORAL at 19:59

## 2023-05-23 RX ADMIN — Medication 975 MILLIGRAM(S): at 00:10

## 2023-05-23 RX ADMIN — OXYCODONE HYDROCHLORIDE 10 MILLIGRAM(S): 5 TABLET ORAL at 20:59

## 2023-05-23 RX ADMIN — OXYCODONE HYDROCHLORIDE 10 MILLIGRAM(S): 5 TABLET ORAL at 06:58

## 2023-05-23 RX ADMIN — OXYCODONE HYDROCHLORIDE 10 MILLIGRAM(S): 5 TABLET ORAL at 05:58

## 2023-05-23 RX ADMIN — Medication 975 MILLIGRAM(S): at 23:16

## 2023-05-23 RX ADMIN — Medication 975 MILLIGRAM(S): at 18:40

## 2023-05-23 RX ADMIN — Medication 975 MILLIGRAM(S): at 11:29

## 2023-05-23 RX ADMIN — ENOXAPARIN SODIUM 40 MILLIGRAM(S): 100 INJECTION SUBCUTANEOUS at 17:40

## 2023-05-23 RX ADMIN — Medication 975 MILLIGRAM(S): at 12:28

## 2023-05-23 RX ADMIN — Medication 975 MILLIGRAM(S): at 01:10

## 2023-05-23 RX ADMIN — ENOXAPARIN SODIUM 40 MILLIGRAM(S): 100 INJECTION SUBCUTANEOUS at 05:58

## 2023-05-23 RX ADMIN — POLYETHYLENE GLYCOL 3350 17 GRAM(S): 17 POWDER, FOR SOLUTION ORAL at 11:29

## 2023-05-23 RX ADMIN — Medication 975 MILLIGRAM(S): at 05:58

## 2023-05-23 RX ADMIN — Medication 975 MILLIGRAM(S): at 17:41

## 2023-05-23 RX ADMIN — SENNA PLUS 2 TABLET(S): 8.6 TABLET ORAL at 21:42

## 2023-05-23 NOTE — PROGRESS NOTE ADULT - ATTENDING COMMENTS
Patient seen and examined on AM rounds  No new complaints   Pain seems to be controlled  OK for d/c home from trauma standpoint
Patient seen and examined  Doing well   No new complaints  Working with patient and case management for safe discharge plan  Cleared from medical standpoint for discharge home

## 2023-05-24 VITALS
DIASTOLIC BLOOD PRESSURE: 72 MMHG | HEART RATE: 74 BPM | TEMPERATURE: 98 F | SYSTOLIC BLOOD PRESSURE: 101 MMHG | OXYGEN SATURATION: 98 % | RESPIRATION RATE: 18 BRPM

## 2023-05-24 RX ORDER — NALOXONE HYDROCHLORIDE 4 MG/.1ML
4 SPRAY NASAL
Qty: 1 | Refills: 0
Start: 2023-05-24

## 2023-05-24 RX ADMIN — ENOXAPARIN SODIUM 40 MILLIGRAM(S): 100 INJECTION SUBCUTANEOUS at 05:37

## 2023-05-24 RX ADMIN — POLYETHYLENE GLYCOL 3350 17 GRAM(S): 17 POWDER, FOR SOLUTION ORAL at 11:21

## 2023-05-24 RX ADMIN — OXYCODONE HYDROCHLORIDE 10 MILLIGRAM(S): 5 TABLET ORAL at 06:36

## 2023-05-24 RX ADMIN — Medication 975 MILLIGRAM(S): at 05:37

## 2023-05-24 RX ADMIN — Medication 975 MILLIGRAM(S): at 12:51

## 2023-05-24 RX ADMIN — Medication 975 MILLIGRAM(S): at 11:22

## 2023-05-24 RX ADMIN — OXYCODONE HYDROCHLORIDE 10 MILLIGRAM(S): 5 TABLET ORAL at 05:41

## 2023-05-24 NOTE — PROGRESS NOTE ADULT - PROVIDER SPECIALTY LIST ADULT
Orthopedics
Trauma Surgery
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
## 2023-05-30 PROBLEM — Z00.00 ENCOUNTER FOR PREVENTIVE HEALTH EXAMINATION: Status: ACTIVE | Noted: 2023-05-30

## 2023-05-30 PROBLEM — Z78.9 OTHER SPECIFIED HEALTH STATUS: Chronic | Status: ACTIVE | Noted: 2023-05-20

## 2023-06-06 ENCOUNTER — APPOINTMENT (OUTPATIENT)
Dept: ORTHOPEDIC SURGERY | Facility: CLINIC | Age: 28
End: 2023-06-06
Payer: COMMERCIAL

## 2023-06-06 PROCEDURE — 73030 X-RAY EXAM OF SHOULDER: CPT | Mod: LT

## 2023-06-06 PROCEDURE — 73590 X-RAY EXAM OF LOWER LEG: CPT | Mod: RT

## 2023-06-06 PROCEDURE — 99024 POSTOP FOLLOW-UP VISIT: CPT

## 2023-06-06 NOTE — BEGINNING OF VISIT
[Patient] : patient [] :  [[employer] ] : provided by [employer]   [Time Spent: ____ minutes] : Total time spent using  services: [unfilled] minutes. The patient's primary language is not English thus required  services. [Interpreters_IDNumber] : [id number] [Interpreters_FullName] : Sandra [[email or web address]] : [place]
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
## 2023-07-06 ENCOUNTER — APPOINTMENT (OUTPATIENT)
Dept: ORTHOPEDIC SURGERY | Facility: CLINIC | Age: 28
End: 2023-07-06
Payer: COMMERCIAL

## 2023-07-06 PROCEDURE — 73030 X-RAY EXAM OF SHOULDER: CPT | Mod: LT

## 2023-07-06 PROCEDURE — 99024 POSTOP FOLLOW-UP VISIT: CPT

## 2023-07-06 PROCEDURE — 73590 X-RAY EXAM OF LOWER LEG: CPT | Mod: RT

## 2023-07-06 NOTE — BEGINNING OF VISIT
[Patient] : patient [] :  [[employer] ] : provided by [employer]   [Interpreters_IDNumber] : [id number] [Interpreters_FullName] : Sandra [[email or web address]] : [place]

## 2023-07-06 NOTE — HISTORY OF PRESENT ILLNESS
[___ Weeks Post Op] : [unfilled] weeks post op [de-identified] : Status post right tibia IM nail on 5/20/2023, left scapular fracture [de-identified] : Ventura is a pleasant 28-year-old male who returns the office today status post right tibia IM nail on 5/20/2023, closed treatment of a left scapular fracture.  This visit was performed with use of a .  \par With regard to his right leg, his discomfort is improving, but not resolved.  He does note some intermittent, mild, sometimes moderate dull aching at the fracture site.  Particularly after any prolonged walking or standing.  He does also report some mild discomfort over the anterior knee with active range of motion.  He denies any crepitus at the fracture site.  He is currently walking without the use of an assistive device. \par With regard to the left shoulder he reports improved, but not resolved discomfort that is intermittent and mild in the periscapular region.  Particular with terminal ranges of motion.  No crepitus of the scapular region with shoulder range of motion. [de-identified] : On exam, the patient is pleasant.  They  are awake, alert, and oriented x3.  The patient walks independently.\par Weight is appropriate for height\par Full range of motion of cervical spine without instability\par Full range of motion of back without instability\par Intact neurologic, vascular, and dermatologic exam to left upper extremity.  Mild tenderness over scapular body.  No crepitus with palpation.\par Patient exhibits full shoulder range of motion with minimal discomfort.\par Rotator cuff strength 5 out of 5.\par Intact neurologic, vascular, and dermatologic exam to right and left lower extremities\par \par Right lower Extremity\par The patient's incisions are well approximated and well-healed.  \par Mild tenderness to palpation at the fracture site mid to distal third of the tibial shaft.  No crepitus with palpation.  Knee range of motion 0 to 120 degrees.  Mild discomfort terminal extension and flexion.\par Ankle range of motion dorsiflexion 30 degrees, plantarflexion 50 degrees, full inversion and eversion.\par 5 out of 5 ankle strength.\par Sensation grossly intact right lower extremity. [de-identified] : X-rays including 2 views of the right tibia and fibula were obtained in the office and reviewed the patient today on 6/6/2023.  The patient is status post right tibia IM nail.  Fracture remains in anatomic alignment.  Hardware is in good position without any evidence of failure.  There does appear to be evidence of early bridging bone and bony callus formation.\par \par X-rays including of the left scapula were obtained in the office today  and reviewed with the patient.  The patient has a scapular body fracture which remains nondisplaced.  Mild bony healing appreciated. [de-identified] : 28-year-old male status post right tibia IM nail on 5/20/2023, left scapular body fracture treated nonoperatively. [de-identified] : Ventura is recovering well from his surgery.  He is currently full weightbearing on the right lower extremity without the use of an assistive device.  He demonstrates relatively full range of motion and strength of the right knee and ankle.  Does note some residual discomfort at the fracture site which I would expect to improve over time.\par He will continue weightbearing as tolerated.  He was instructed on active range of motion exercises for the ankle and knee.\par With regard to his left shoulder scapular fracture, he reports improved, but not resolved discomfort.  He exhibits full range of motion and strength of the left shoulder at this time.  He will progress with activity as tolerated with regard to the left shoulder but will avoid any heavy lifting over the next 6 weeks.\par Follow-up in 6 weeks for clinical and x-ray reevaluation of the right tibia and left scapula.  He was on board with the plan.  All questions answered.

## 2023-07-20 PROCEDURE — 71045 X-RAY EXAM CHEST 1 VIEW: CPT

## 2023-07-20 PROCEDURE — 82553 CREATINE MB FRACTION: CPT

## 2023-07-20 PROCEDURE — 99285 EMERGENCY DEPT VISIT HI MDM: CPT | Mod: 25

## 2023-07-20 PROCEDURE — 87640 STAPH A DNA AMP PROBE: CPT

## 2023-07-20 PROCEDURE — 93005 ELECTROCARDIOGRAM TRACING: CPT

## 2023-07-20 PROCEDURE — 84132 ASSAY OF SERUM POTASSIUM: CPT

## 2023-07-20 PROCEDURE — G1004: CPT

## 2023-07-20 PROCEDURE — 96376 TX/PRO/DX INJ SAME DRUG ADON: CPT

## 2023-07-20 PROCEDURE — 85018 HEMOGLOBIN: CPT

## 2023-07-20 PROCEDURE — 82550 ASSAY OF CK (CPK): CPT

## 2023-07-20 PROCEDURE — 85610 PROTHROMBIN TIME: CPT

## 2023-07-20 PROCEDURE — 85027 COMPLETE CBC AUTOMATED: CPT

## 2023-07-20 PROCEDURE — 86850 RBC ANTIBODY SCREEN: CPT

## 2023-07-20 PROCEDURE — 87641 MR-STAPH DNA AMP PROBE: CPT

## 2023-07-20 PROCEDURE — 85730 THROMBOPLASTIN TIME PARTIAL: CPT

## 2023-07-20 PROCEDURE — 82435 ASSAY OF BLOOD CHLORIDE: CPT

## 2023-07-20 PROCEDURE — 85014 HEMATOCRIT: CPT

## 2023-07-20 PROCEDURE — C9399: CPT

## 2023-07-20 PROCEDURE — 70486 CT MAXILLOFACIAL W/O DYE: CPT | Mod: MA

## 2023-07-20 PROCEDURE — 84100 ASSAY OF PHOSPHORUS: CPT

## 2023-07-20 PROCEDURE — 86901 BLOOD TYPING SEROLOGIC RH(D): CPT

## 2023-07-20 PROCEDURE — 73090 X-RAY EXAM OF FOREARM: CPT

## 2023-07-20 PROCEDURE — 73610 X-RAY EXAM OF ANKLE: CPT

## 2023-07-20 PROCEDURE — 80307 DRUG TEST PRSMV CHEM ANLYZR: CPT

## 2023-07-20 PROCEDURE — 73590 X-RAY EXAM OF LOWER LEG: CPT

## 2023-07-20 PROCEDURE — C1713: CPT

## 2023-07-20 PROCEDURE — 82947 ASSAY GLUCOSE BLOOD QUANT: CPT

## 2023-07-20 PROCEDURE — 72125 CT NECK SPINE W/O DYE: CPT | Mod: MG

## 2023-07-20 PROCEDURE — 86900 BLOOD TYPING SEROLOGIC ABO: CPT

## 2023-07-20 PROCEDURE — 36415 COLL VENOUS BLD VENIPUNCTURE: CPT

## 2023-07-20 PROCEDURE — 85025 COMPLETE CBC W/AUTO DIFF WBC: CPT

## 2023-07-20 PROCEDURE — T1013: CPT

## 2023-07-20 PROCEDURE — 80048 BASIC METABOLIC PNL TOTAL CA: CPT

## 2023-07-20 PROCEDURE — 96374 THER/PROPH/DIAG INJ IV PUSH: CPT

## 2023-07-20 PROCEDURE — 90715 TDAP VACCINE 7 YRS/> IM: CPT

## 2023-07-20 PROCEDURE — 96375 TX/PRO/DX INJ NEW DRUG ADDON: CPT

## 2023-07-20 PROCEDURE — 80053 COMPREHEN METABOLIC PANEL: CPT

## 2023-07-20 PROCEDURE — 82803 BLOOD GASES ANY COMBINATION: CPT

## 2023-07-20 PROCEDURE — 76000 FLUOROSCOPY <1 HR PHYS/QHP: CPT

## 2023-07-20 PROCEDURE — C1769: CPT

## 2023-07-20 PROCEDURE — 73060 X-RAY EXAM OF HUMERUS: CPT

## 2023-07-20 PROCEDURE — 70450 CT HEAD/BRAIN W/O DYE: CPT | Mod: MG

## 2023-07-20 PROCEDURE — 74177 CT ABD & PELVIS W/CONTRAST: CPT | Mod: MA

## 2023-07-20 PROCEDURE — 83605 ASSAY OF LACTIC ACID: CPT

## 2023-07-20 PROCEDURE — 73030 X-RAY EXAM OF SHOULDER: CPT

## 2023-07-20 PROCEDURE — 73000 X-RAY EXAM OF COLLAR BONE: CPT

## 2023-07-20 PROCEDURE — 84484 ASSAY OF TROPONIN QUANT: CPT

## 2023-07-20 PROCEDURE — 84295 ASSAY OF SERUM SODIUM: CPT

## 2023-07-20 PROCEDURE — 73080 X-RAY EXAM OF ELBOW: CPT

## 2023-07-20 PROCEDURE — 82330 ASSAY OF CALCIUM: CPT

## 2023-07-20 PROCEDURE — 73564 X-RAY EXAM KNEE 4 OR MORE: CPT

## 2023-07-20 PROCEDURE — 71260 CT THORAX DX C+: CPT | Mod: MA

## 2023-07-20 PROCEDURE — 83735 ASSAY OF MAGNESIUM: CPT

## 2023-08-10 ENCOUNTER — APPOINTMENT (OUTPATIENT)
Dept: ORTHOPEDIC SURGERY | Facility: CLINIC | Age: 28
End: 2023-08-10
Payer: SELF-PAY

## 2023-08-10 DIAGNOSIS — S42.102A FRACTURE OF UNSPECIFIED PART OF SCAPULA, LEFT SHOULDER, INITIAL ENCOUNTER FOR CLOSED FRACTURE: ICD-10-CM

## 2023-08-10 PROCEDURE — 73590 X-RAY EXAM OF LOWER LEG: CPT | Mod: RT

## 2023-08-10 PROCEDURE — 99024 POSTOP FOLLOW-UP VISIT: CPT

## 2023-08-10 PROCEDURE — 73010 X-RAY EXAM OF SHOULDER BLADE: CPT | Mod: LT

## 2023-08-10 RX ORDER — MELOXICAM 15 MG/1
15 TABLET ORAL
Qty: 21 | Refills: 0 | Status: ACTIVE | COMMUNITY
Start: 2023-08-10 | End: 1900-01-01

## 2023-08-10 NOTE — HISTORY OF PRESENT ILLNESS
[___ Weeks Post Op] : [unfilled] weeks post op [de-identified] : Status post right tibia IM nail on 5/20/2023, left scapular fracture [de-identified] : Ventura is a pleasant 28-year-old male who returns the office today status post right tibia IM nail on 5/20/2023, closed treatment of a left scapular fracture.  The patient states that he is improving and is feeling better today.  He has improvements in his pain in both the shoulder and his leg.  He is ambulating with the use of a cane for assistance.  He returns today for routine clinical and radiographic follow-up.  Denies any redness, warmth, drainage, fevers, chills, sweats, numbness, tingling, or pain elsewhere [de-identified] : On exam, the patient is pleasant.  They  are awake, alert, and oriented x3.  The patient walks with a cane for assistance. Weight is appropriate for height Full range of motion of cervical spine without instability Full range of motion of back without instability Intact neurologic, vascular, and dermatologic exam to left upper extremity.  Mild tenderness over scapular body.  No crepitus with palpation. Patient exhibits full shoulder range of motion with minimal discomfort. Rotator cuff strength 5 out of 5. Intact neurologic, vascular, and dermatologic exam to right and left lower extremities  Right lower Extremity The patient's incisions are well approximated and well-healed.  No signs of infection. Mild tenderness to palpation at the fracture site mid to distal third of the tibial shaft.  No crepitus with palpation.  Knee range of motion 0 to 130 degrees.  Mild discomfort terminal extension and flexion.  Mild discomfort about the patellofemoral compartment. Ankle range of motion dorsiflexion 30 degrees, plantarflexion 50 degrees, full inversion and eversion. 5 out of 5 ankle strength. Sensation grossly intact right lower extremity.   Left upper Extremity Skin is intact without any evidence of ecchymosis or swelling.  There is mild tenderness palpation at the scaphoid no erythema, warmth, or signs of infection. No bony tenderness to palpation about the shoulder. PROM: Forward flexion to 175, external rotation of 45, internal rotation to L3. Strength supraspinatus 5/5, infraspinatus 5/5, subscapularis 5/5.  AIN/PIN/radial/ulnar/median/musculocutaneous/axillary motor function is intact, sensations intact light touch in C5-T1 distributions, radial pulses 2+, compartments are soft and compressible. [de-identified] : X-rays including 2 views of the right tibia and fibula were obtained in the office today and reviewed the patient.  Patient is status post right tibia intramedullary nail.  Fracture demonstrates signs of healing with callus bridging the fracture site.  X-rays including 2 views of the left scapula were obtained in the office today and reviewed the patient.  Patient scapular body fracture is healing in overall acceptable alignment. [de-identified] : 28-year-old male status post right tibia IM nail on 5/20/2023, left scapular body fracture treated nonoperatively. [de-identified] : Ventura is recovering well from his surgery.  He has had improvements in his pain since today's surgery.  He still maintains some mild tenderness palpation about the tibia as well as the scapular body but overall feels he is improving.  He is ambulating with the use of a cane for assistance.  His x-rays do demonstrate healing of both fractures today.  At this time he may gradually begin to increase his weightbearing and activity as tolerated.  I recommend a course of physical therapy to continue rehabilitating his knee.  He does have some patellofemoral symptoms today.  Recommend follow-up in 3 months for repeat clinical and radiographic assessment.  He verbalizes understanding and agrees with the plan.  All questions were answered to his satisfaction.

## 2023-08-10 NOTE — BEGINNING OF VISIT
[Patient] : patient [] :  [[employer] ] : provided by [employer]   [Interpreters_IDNumber] : [id number] [Interpreters_FullName] : Sandra [[id number]] : [place]

## 2023-12-04 ENCOUNTER — APPOINTMENT (OUTPATIENT)
Dept: ORTHOPEDIC SURGERY | Facility: CLINIC | Age: 28
End: 2023-12-04
Payer: SELF-PAY

## 2023-12-04 DIAGNOSIS — S82.201A UNSPECIFIED FRACTURE OF SHAFT OF RIGHT TIBIA, INITIAL ENCOUNTER FOR CLOSED FRACTURE: ICD-10-CM

## 2023-12-04 PROCEDURE — 99213 OFFICE O/P EST LOW 20 MIN: CPT

## 2023-12-04 PROCEDURE — 73590 X-RAY EXAM OF LOWER LEG: CPT | Mod: RT
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
## (undated) DEVICE — VENODYNE/SCD SLEEVE CALF MEDIUM

## (undated) DEVICE — NDL HYPO REGULAR BEVEL 25G X 1.5" (BLUE)

## (undated) DEVICE — DRAPE SPLIT SHEET 77" X 108"

## (undated) DEVICE — GLV 8 PROTEXIS (WHITE)

## (undated) DEVICE — TOURNIQUET ESMARK 6"

## (undated) DEVICE — SUT MONOCRYL 3-0 27" PS-2 UNDYED

## (undated) DEVICE — SOL IRR POUR NS 0.9% 1000ML

## (undated) DEVICE — DRAPE EXTREMITY 87" X 106" X 128"

## (undated) DEVICE — BLADE SURGICAL #15 CARBON

## (undated) DEVICE — FRAZIER SUCTION TIP 10FR

## (undated) DEVICE — DRAPE IOBAN 33" X 23"

## (undated) DEVICE — DRAPE 1/2 SHEET 40X57"

## (undated) DEVICE — DRAPE XL SHEET 77X98"

## (undated) DEVICE — DRILL BIT STRYKER ORTHO FREEHAND 4.2X185MM

## (undated) DEVICE — TOURNIQUET CUFF 34" DUAL PORT W PLC

## (undated) DEVICE — DRAPE C ARM UNIVERSAL

## (undated) DEVICE — GLV 7.5 PROTEXIS (WHITE)

## (undated) DEVICE — WARMING BLANKET UPPER ADULT

## (undated) DEVICE — DRAPE MAYO STAND 23"

## (undated) DEVICE — SUT NYLON 3-0 18" PS-2

## (undated) DEVICE — SUT MONOCRYL 2-0 27" SH UNDYED

## (undated) DEVICE — ELCTR GROUNDING PAD ADULT COVIDIEN

## (undated) DEVICE — DRAPE TOWEL BLUE 17" X 24"

## (undated) DEVICE — REAMER STRYKER ORTHO SHAFT MOD TRINKLE

## (undated) DEVICE — SOL IRR POUR H2O 1000ML

## (undated) DEVICE — DRILL BIT STRYKER ORTHO LOKG 4.2X360MM

## (undated) DEVICE — DRAPE C ARM C-ARMOUR

## (undated) DEVICE — PACK EXTREMITY